# Patient Record
(demographics unavailable — no encounter records)

---

## 2025-02-27 NOTE — HISTORY OF PRESENT ILLNESS
[FreeTextEntry1] : 81 year old male with HTN, HLD, DM2 (on oral medication), JATINDER (not on CPAP),vertigo, CAD and severe AS s/p CABG x3 (LIMA-LAD, SVG-PDL, SVG-OM), #23 Inspiris Aortic valve + TAMMI clip with post-operative course complicated by complete heart block. Now s/p MDT DC PPM on 1/13/25 with Dr. Correa.    Patient reports to the office today s/p PPM insertion following CHB post CABG. Since hospital discharge patient has been doing well and denies symptoms of arrhythmia without c/o palpitations, chest pain/tightness, dizziness, syncope or near syncope. Since hospital discharge, he has not been taking medications as prescribed from discharge note. He took medications prescribed at the hospital for ~ 1 month post discharged and stopped. He stopped taking Eliquis two weeks ago.   Left chest wall pacemaker incision is well approximated without erythema, edema, drainage, exudate or signs of pocket infection.  -MDT DC PPM interrogation today reveals normal function. Battery life ~ 11.3 years to RRT.  Adequate sensing and pacing thresholds. Stable lead impedance. A-Paced <0.1% , V-pacing 89.8%, Multiple AF episodes recorded on the device. AF burden 5.5%. Avg V-rates > 100 bpm during AF.   EKG today reveals: NSR with V-pacing HR 78 bpm, RBBB TTE 1/3/25: LVEF 60 to 65 %

## 2025-02-27 NOTE — REVIEW OF SYSTEMS
[Feeling Fatigued] : not feeling fatigued [SOB] : no shortness of breath [Dyspnea on exertion] : not dyspnea during exertion [Chest Discomfort] : no chest discomfort [Lower Ext Edema] : no extremity edema [Palpitations] : no palpitations [Orthopnea] : no orthopnea [Syncope] : no syncope [Cough] : no cough [Rash] : no rash [Dizziness] : no dizziness [Weakness] : no weakness [Easy Bleeding] : no tendency for easy bleeding Azathioprine Counseling:  I discussed with the patient the risks of azathioprine including but not limited to myelosuppression, immunosuppression, hepatotoxicity, lymphoma, and infections.  The patient understands that monitoring is required including baseline LFTs, Creatinine, possible TPMP genotyping and weekly CBCs for the first month and then every 2 weeks thereafter.  The patient verbalized understanding of the proper use and possible adverse effects of azathioprine.  All of the patient's questions and concerns were addressed.

## 2025-02-27 NOTE — CARDIOLOGY SUMMARY
[de-identified] : 2/27/25: NSR with V-pacing HR bpm, RBBB  [de-identified] : TTE 1/3/25 CONCLUSIONS: 1. Left ventricular cavity is normal in size. Left ventricular wall thickness is normal. Left ventricular systolic function is normal with an ejection fraction visually estimated at 60 to 65 %. 2. There is mild (grade 1) left ventricular diastolic dysfunction, with elevated left ventricular filling pressure. 3. Normal right ventricular cavity size and normal right ventricular systolic function. 4. Left atrium is normal in size. 5. There is mild calcification of the mitral valve annulus. 6. Thickened mitral valve leaflets. Mild mitral valve leaflet calcification. 7. Trace mitral regurgitation. 8. Trileaflet aortic valve. There is severe calcification of the aortic valve leaflets. Severe aortic stenosis. 9. Trace aortic regurgitation. 10. No pericardial effusion seen. 11. Pulmonary artery systolic pressure could not be estimated.

## 2025-02-27 NOTE — PHYSICAL EXAM
[Well Developed] : well developed [No Acute Distress] : no acute distress [Normal S1, S2] : normal S1, S2 [Clear Lung Fields] : clear lung fields [No Edema] : no edema [No Rash] : no rash [Moves all extremities] : moves all extremities [Alert and Oriented] : alert and oriented [de-identified] : Left chest wall pacemaker incision is well approximated without erythema, edema, drainage, exudate or signs of pocket infection.

## 2025-02-27 NOTE — PHYSICAL EXAM
[Well Developed] : well developed [No Acute Distress] : no acute distress [Normal S1, S2] : normal S1, S2 [Clear Lung Fields] : clear lung fields [No Edema] : no edema [No Rash] : no rash [Moves all extremities] : moves all extremities [Alert and Oriented] : alert and oriented [de-identified] : Left chest wall pacemaker incision is well approximated without erythema, edema, drainage, exudate or signs of pocket infection.

## 2025-02-27 NOTE — DISCUSSION/SUMMARY
[EKG obtained to assist in diagnosis and management of assessed problem(s)] : EKG obtained to assist in diagnosis and management of assessed problem(s) [FreeTextEntry1] : 81 year old male with HTN, HLD, DM2 (on oral medication), JATINDER (not on CPAP),vertigo, CAD and severe AS s/p CABG x3 (LIMA-LAD, SVG-PDL, SVG-OM), #23 Inspiris Aortic valve + TAMMI clip with post-operative course complicated by complete heart block. Now s/p MDT DC PPM on 1/13/25 with Dr. Correa.    Patient reported to the office today s/p PPM insertion following CHB post CABG. Since hospital discharge patient has been doing well and denies symptoms of arrhythmia without c/o palpitations, chest pain/tightness, dizziness, syncope or near syncope. Left chest wall pacemaker incision is now healed well without s/s of infection or complications. MDT PPM interrogation today reveals normal function. Battery life ~ 11.3 years to RRT. V-pacing 89.8% AF burden 5.5% with rapid V-rates. There was confusion about what medications he should be taking post hospital discharge. He took the prescribed medications for ~ 1 month and then stopped. Eliquis re-prescribed today for AF and Metoprolol 50mg will be continued.   Recommendations -Continue remote monitoring of MDT PPM -Report any new symptoms of arrhythmia to office  -Re-start Eliquis 5 mg BID  -Continue Metoprolol 50mg  -F/u with Cardiologist Dr. Harper  -EP f/u in 6 months or sooner PRN

## 2025-02-27 NOTE — CARDIOLOGY SUMMARY
[de-identified] : 2/27/25: NSR with V-pacing HR bpm, RBBB  [de-identified] : TTE 1/3/25 CONCLUSIONS: 1. Left ventricular cavity is normal in size. Left ventricular wall thickness is normal. Left ventricular systolic function is normal with an ejection fraction visually estimated at 60 to 65 %. 2. There is mild (grade 1) left ventricular diastolic dysfunction, with elevated left ventricular filling pressure. 3. Normal right ventricular cavity size and normal right ventricular systolic function. 4. Left atrium is normal in size. 5. There is mild calcification of the mitral valve annulus. 6. Thickened mitral valve leaflets. Mild mitral valve leaflet calcification. 7. Trace mitral regurgitation. 8. Trileaflet aortic valve. There is severe calcification of the aortic valve leaflets. Severe aortic stenosis. 9. Trace aortic regurgitation. 10. No pericardial effusion seen. 11. Pulmonary artery systolic pressure could not be estimated.

## 2025-02-27 NOTE — REVIEW OF SYSTEMS
[Feeling Fatigued] : not feeling fatigued [SOB] : no shortness of breath [Dyspnea on exertion] : not dyspnea during exertion [Chest Discomfort] : no chest discomfort [Lower Ext Edema] : no extremity edema [Palpitations] : no palpitations [Orthopnea] : no orthopnea [Syncope] : no syncope [Cough] : no cough [Rash] : no rash [Dizziness] : no dizziness [Weakness] : no weakness [Easy Bleeding] : no tendency for easy bleeding